# Patient Record
Sex: FEMALE | Race: WHITE
[De-identification: names, ages, dates, MRNs, and addresses within clinical notes are randomized per-mention and may not be internally consistent; named-entity substitution may affect disease eponyms.]

---

## 2018-03-15 ENCOUNTER — HOSPITAL ENCOUNTER (EMERGENCY)
Dept: HOSPITAL 80 - FED | Age: 33
Discharge: HOME | End: 2018-03-15
Payer: COMMERCIAL

## 2018-03-15 VITALS
OXYGEN SATURATION: 94 % | RESPIRATION RATE: 18 BRPM | DIASTOLIC BLOOD PRESSURE: 65 MMHG | HEART RATE: 71 BPM | SYSTOLIC BLOOD PRESSURE: 127 MMHG | TEMPERATURE: 97.9 F

## 2018-03-15 DIAGNOSIS — M79.605: Primary | ICD-10-CM

## 2018-03-15 NOTE — EDPHY
HPI/HX/ROS/PE/MDM


Narrative: 


CHIEF COMPLAINT: Left leg pain





HPI:


The patient is a 32 y/o female with a history of PCOS and a cholecystectomy 

complaining of left leg pain, onset yesterday. Denies recent trauma or injury. 

On 03/10/18, 5 days ago, she returned home from [facility] on a 12 hour flight. 

Yesterday she noticed that her left ankle was swollen and painful. Her left 

calf also felt more tight than normal. Today she went to an urgent care who 

advised that she present to an emergency department.  Denies chest pain, 

shortness of breath, history of PE or DVT. No headache, cough, abdominal pain, 

urinary or bowel complaints, fever.





REVIEW OF SYSTEMS:


Aside from elements discussed in the HPI, a comprehensive 10-point review of 

systems was reviewed and is negative.





PMH: Cholecystectomy, PCOS





SOCIAL HISTORY: Lives in [place], single, employed





PHYSICAL EXAM:


General: Patient is alert, in no acute distress.


ENT: Eyes are normal to inspection.  ENT inspection normal.


Neck: Normal inspection.  Full range of motion.


Respiratory: No respiratory distress.  Breath sounds normal bilaterally.


Cardiovascular: Regular rate and rhythm.  Strong peripheral pulses.  Normal cap 

refill.


Abdomen: The abdomen is nontender to palpation. There are no peritoneal signs. 

There are normal bowel sounds.


Back: Normal to inspection.  No tenderness to palpation.


Skin: Normal color.  No rash.  Warm and dry.


Extremities: Normal appearance.  Full range of motion.


Neuro: Oriented x3.  Normal motor function.  Normal sensory function.








ED Course: 





1928: Spoke with radiologist, patient's leg US is negative for a DVT.





1939: Reassessed patient and discussed imaging findings. Return precautions 

provided, patient is comfortable with this plan.


MDM: 





This patient presents with LLE swelling and tightness after a long 

international plane flight.  Thankfully, US is negative for DVT or other 

significant abnormality.  Radiologist notes a small fluid collection not 

associated with vasculature but does not think this is significant, and there 

is no correlation I see to exam - no signs of FB or trauma.  The patient denies 

chest pain or SOB to suggest PE.  She is comfortable following-up with her PCP 

for further evaluation.





- Data Points


Imaging Results: 


 Imaging Impressions





Extremity Venous Study  03/15/18 18:33


Impression:


 


1.  No sonographic evidence of left lower extremity deep vein thrombosis.


2.  Small fluid collection along the anterior tibia in the area of the patient'

s pain is of uncertain clinical significance. Correlate for recent trauma to 

this area.


 


Dr. Nunes discussed these findings by telephone with Gregory Valentine MD on 3

/15/2018 at 1928 hours.


 











Imaging: Discussed imaging studies w/ On call Radiologist





General


Time Seen by Provider: 03/15/18 18:19


Initial Vital Signs: 


 Initial Vital Signs











Temperature (C)  37.1 C   03/15/18 18:02


 


Heart Rate  64   03/15/18 18:02


 


Respiratory Rate  16   03/15/18 18:02


 


Blood Pressure  131/82 H  03/15/18 18:02


 


O2 Sat (%)  97   03/15/18 18:02








 











O2 Delivery Mode               Room Air














Allergies/Adverse Reactions: 


 





Sulfa (Sulfonamide Antibiotics) Allergy (Verified 03/15/18 18:02)


 








Home Medications: 














 Medication  Instructions  Recorded


 


Bcp  03/15/18














Departure





- Departure


Disposition: Home, Routine, Self-Care


Clinical Impression: 


 Left leg pain





Condition: Good


Instructions:  Leg Pain (ED)


Additional Instructions: 


Follow-up with your primary doctor within 72 hours.  Return to the Emergency 

Department for fever, chest pain, shortness of breath, increasing pain or other 

worsening of condition.





Referrals: 


[place] Alaniz MD [Primary Care Provider] - As per Instructions


Report Scribed for: Gregory Valentine


Report Scribed by: Eliza Cruz


Date of Report: 03/15/18


Time of Report: 18:21


Physician Review and Approval Statement: Portions of this note were transcribed 

by an ED scribe.  I personally performed the history, physical exam, and 

medical decision making; and confirm the accuracy of the information in the 

transcribed note.